# Patient Record
Sex: FEMALE | Race: WHITE | NOT HISPANIC OR LATINO | Employment: FULL TIME | URBAN - METROPOLITAN AREA
[De-identification: names, ages, dates, MRNs, and addresses within clinical notes are randomized per-mention and may not be internally consistent; named-entity substitution may affect disease eponyms.]

---
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
## 2023-08-23 ENCOUNTER — HOSPITAL ENCOUNTER (EMERGENCY)
Facility: HOSPITAL | Age: 23
Discharge: HOME | End: 2023-08-23
Attending: EMERGENCY MEDICINE
Payer: COMMERCIAL

## 2023-08-23 VITALS
WEIGHT: 225 LBS | SYSTOLIC BLOOD PRESSURE: 143 MMHG | HEART RATE: 105 BPM | HEIGHT: 58 IN | RESPIRATION RATE: 18 BRPM | DIASTOLIC BLOOD PRESSURE: 84 MMHG | BODY MASS INDEX: 47.23 KG/M2 | TEMPERATURE: 97.9 F | OXYGEN SATURATION: 99 %

## 2023-08-23 DIAGNOSIS — R45.86 EMOTIONAL LABILITY: Primary | ICD-10-CM

## 2023-08-23 PROCEDURE — 99282 EMERGENCY DEPT VISIT SF MDM: CPT

## 2023-08-23 NOTE — ED PROVIDER NOTES
Subjective   History of Present Illness  23-year-old female presents with complaint of emotions running out of control.  This has been a problem for several months.  Patient is here with her  who has also checked in for similar complaints.  They both describe increased stressors in their life recently which is left them both more anxious, emotional, irritable.  They report that the slightest stressor such as spilling a cup of coffee lead them to both become upset.  Tonight they had an episode where both of them were crying and upset, patient's mother-in-law was ultimately called and suggested they present here for evaluation.  They have both started outpatient therapy for these issues and has had 2 appointments so far.  They are not established with primary care.  They both deny any suicidal or homicidal ideation.  No psychotic symptoms.  They are not on any antidepressant organic anxiety medications and are not established with primary care.  Patient states she does feel comfortable and safe at home.    Review of Systems   All other systems reviewed and are negative.    Past Medical History:   Diagnosis Date    Hyperlipidemia        No Known Allergies    History reviewed. No pertinent surgical history.    History reviewed. No pertinent family history.    Social History     Socioeconomic History    Marital status:    Tobacco Use    Smoking status: Never    Smokeless tobacco: Never   Vaping Use    Vaping Use: Some days    Substances: Nicotine    Devices: Pre-filled or refillable cartridge   Substance and Sexual Activity    Alcohol use: No    Drug use: No    Sexual activity: Never           Objective   Physical Exam  Constitutional:       General: She is not in acute distress.     Appearance: She is not ill-appearing or toxic-appearing.      Comments: Sometimes tearful but answers questions appropriately   HENT:      Head: Normocephalic and atraumatic.      Mouth/Throat:      Mouth: Mucous membranes are  moist.      Pharynx: Oropharynx is clear.   Eyes:      Extraocular Movements: Extraocular movements intact.      Pupils: Pupils are equal, round, and reactive to light.   Cardiovascular:      Rate and Rhythm: Normal rate and regular rhythm.   Pulmonary:      Effort: Pulmonary effort is normal. No respiratory distress.   Musculoskeletal:         General: No deformity or signs of injury. Normal range of motion.   Skin:     General: Skin is warm and dry.   Neurological:      General: No focal deficit present.      Mental Status: She is alert and oriented to person, place, and time. Mental status is at baseline.   Psychiatric:         Attention and Perception: Attention normal.         Mood and Affect: Affect is tearful.         Speech: Speech normal.         Behavior: Behavior normal. Behavior is cooperative.         Thought Content: Thought content normal.         Cognition and Memory: Cognition normal.         Judgment: Judgment normal.       Procedures           ED Course  ED Course as of 08/23/23 0630   Wed Aug 23, 2023   0629 Patient denies SI.  She is tearful but otherwise appropriate was able to have a long conversation with me about her current predicament.  I did offer to consult time team for patient to speak to them further but patient does not have indication for emergency psychiatric intervention and she is already established with outpatient services so patient ultimately declined.  I encouraged her to establish care with primary care and to keep up with her therapy appointments.  We discussed some techniques for managing stress.  Ultimately patient comfortable with plan for discharge. [TD]      ED Course User Index  [TD] Seth Bah MD                                           Medical Decision Making  Problems Addressed:  Emotional lability: acute illness or injury        Final diagnoses:   Emotional lability       ED Disposition  ED Disposition       ED Disposition   Discharge    Condition    Stable    Comment   --               [facility] [place] [address]  [phone]  Call today           Medication List      No changes were made to your prescriptions during this visit.            Seth Bah MD  08/23/23 0603